# Patient Record
Sex: FEMALE | Race: WHITE | NOT HISPANIC OR LATINO | Employment: STUDENT | URBAN - METROPOLITAN AREA
[De-identification: names, ages, dates, MRNs, and addresses within clinical notes are randomized per-mention and may not be internally consistent; named-entity substitution may affect disease eponyms.]

---
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
## 2022-01-17 ENCOUNTER — TELEPHONE (OUTPATIENT)
Dept: OBGYN | Facility: CLINIC | Age: 22
End: 2022-01-17
Payer: COMMERCIAL

## 2022-01-17 DIAGNOSIS — Z30.011 ENCOUNTER FOR INITIAL PRESCRIPTION OF CONTRACEPTIVE PILLS: Primary | ICD-10-CM

## 2022-01-17 NOTE — TELEPHONE ENCOUNTER
Patient calling with abnormal discharge.  Currently on norelgestromin-ethinyl estradiol (ORTHO EVRA) 150-35 MCG/24HR patch  Brown in color over last 5 days. Now also starting to have some light red spotting. This is not the normal time she would usually get her periods.    Patch came loose in the shower about 5 days ago. Patient states she tried to reapply it, and although it was always on her body, it was not sticking well. Patient just placed a new patch yesterday.    Explained that her body may not have been absorbing the hormones correctly  within the patch if it was not 'sticking' as it should and by potentially having less hormones in her body, it could trigger a period.    Routing pt [employer] message to provider to further advise.  Patient aware that ADELA Ware NP is out of of the office today, she will follow-up tomorrow and either she or a triage nurse will call her back.    Pt verbalized understanding, in agreement with plan, and voiced no further questions.  Joey Anthony RN on 1/17/2022 at 1:51 PM

## 2022-01-19 NOTE — TELEPHONE ENCOUNTER
Left detailed message w response below. Encouraged to call w questions.  Riri Cardozo RN on 1/19/2022 at 4:19 PM

## 2022-01-19 NOTE — TELEPHONE ENCOUNTER
She did the right thing by applying a new patch.     If she continues to have spotting when she is due to change her patch again, she should take a week off and allow a menstrual cycle.     If she finds her patches are not sticking well in the future, we may need to discuss alternative options.

## 2022-01-21 NOTE — TELEPHONE ENCOUNTER
Discussed with patient Riri Ware's instructions below. Patient understood and agrees with plan. Feels as though she would like top switch to ocp versus patch. Feels as though with her swimming and working out and sweating that the patch just does not like to stay on her skin and will curl up on sides.     Patient is open to any ocp options that NORI Mckeon CNP recommends.  Pharmacy updated. Is aware we will call her next week with ocp instructions.       Elise Matos RN

## 2022-01-25 RX ORDER — NORETHINDRONE ACETATE AND ETHINYL ESTRADIOL 1MG-20(21)
1 KIT ORAL DAILY
Qty: 84 TABLET | Refills: 3 | Status: SHIPPED | OUTPATIENT
Start: 2022-01-25 | End: 2023-01-26 | Stop reason: ALTCHOICE

## 2022-01-25 NOTE — TELEPHONE ENCOUNTER
[facility] patient to update her.  [facility]    Joey Anthony RN on 1/25/2022 at 1:30 PM

## 2022-06-28 NOTE — PROGRESS NOTES
SUBJECTIVE:                                                   Juanita Gilbert is a 21 year old female who presents to clinic today for the following health issue(s):  Patient presents with:  Other: Discharge brown            HPI:  Patient is on the ortho evra patch.  She is having brown discharge like old blood in 2nd week of patch.  This has happened in the past when she did continous. In January she was going to change to OCP's but decided not too and stayed on patch bleeding got better.  She also noted a lump under her left armpit last 2 days.      Patient's last menstrual period was 2022..     Patient is sexually active, .  Using Patch for contraception.    reports that she has never smoked. She has never used smokeless tobacco.    STD testing offered?  Declined    Health maintenance updated:  yes    Today's PHQ-2 Score: No flowsheet data found.  Today's PHQ-9 Score:   PHQ-9 SCORE 10/7/2021   PHQ-9 Total Score 2     Today's HENRY-7 Score:   HENRY-7 SCORE 10/7/2021   Total Score 2       Problem list and histories reviewed & adjusted, as indicated.  Additional history: as documented.    There is no problem list on file for this patient.    Past Surgical History:   Procedure Laterality Date     APPENDECTOMY      Child     TONSILLECTOMY        Social History     Tobacco Use     Smoking status: Never Smoker     Smokeless tobacco: Never Used   Substance Use Topics     Alcohol use: Not Currently     Comment: Rare      Problem (# of Occurrences) Relation (Name,Age of Onset)    Hashimoto's thyroiditis (1) Mother    No Known Problems (8) Father, Sister, Brother, Maternal Grandmother, Maternal Grandfather, Paternal Grandmother, Paternal Grandfather, Other    Other - See Comments (1) Mother: Adenomyosis, had a hysterectomy            Current Outpatient Medications   Medication Sig     loratadine (CLARITIN) 10 MG tablet Take 10 mg by mouth daily     norelgestromin-ethinyl estradiol (ORTHO EVRA) 150-35 MCG/24HR  patch Remove old patch and apply new patch onto the skin once a week for 3 weeks (21 days). Do not wear patch week 4 (days 22-28), then repeat.     norethindrone-ethinyl estradiol (JUNEL FE 1/20) 1-20 MG-MCG tablet Take 1 tablet by mouth daily (Patient not taking: Reported on 6/30/2022)     No current facility-administered medications for this visit.     Allergies   Allergen Reactions     Ciprofloxacin Other (See Comments)     Family -severe reaction     Azithromycin Rash     11-            Dermat.-     Penicillins Rash     Red rash around the neck       ROS:  12 point review of systems negative other than symptoms noted below or in the HPI.  No urinary frequency or dysuria, bladder or kidney problems      OBJECTIVE:     /66   Wt 64.4 kg (142 lb)   LMP 06/14/2022   BMI 22.08 kg/m    Body mass index is 22.08 kg/m .    Exam:  Constitutional:  Appearance: Well nourished, well developed alert, in no acute distress  Neurologic:  Mental Status:  Oriented X3.  Normal strength and tone, sensory exam grossly normal, mentation intact and speech normal.    Psychiatric:  Mentation appears normal and affect normal/bright.  Pelvic Exam:  External Genitalia:     Normal appearance for age, no discharge present, no tenderness present, no inflammatory lesions present, color normal  Vagina:     Normal vaginal vault without central or paravaginal defects, no discharge present, no inflammatory lesions present, no masses present  Bladder:     Nontender to palpation  Urethra:   Urethral Body:  Urethra palpation normal, urethra structural support normal   Urethral Meatus:  No erythema or lesions present  Cervix:     Appearance healthy, no lesions present, nontender to palpation, light brown bleeding present  Perineum:     Perineum within normal limits, no evidence of trauma, no rashes or skin lesions present  Anus:     Anus within normal limits, no hemorrhoids present  Inguinal Lymph Nodes:     No lymphadenopathy  present  Pubic Hair:     Normal pubic hair distribution for age  Genitalia and Groin:     No rashes present, no lesions present, no areas of discoloration, no masses present  Left armpit has a pea size hard lump appears to be a ingrown hair.  Patient did shave 2 days ago.  No lymph node noted.       In-Clinic Test Results:  No results found for this or any previous visit (from the past 24 hour(s)).    ASSESSMENT/PLAN:                                                        ICD-10-CM    1. Lump in armpit, left  R22.32    2. DUB (dysfunctional uterine bleeding)  N93.8          Discussed changing to a IUD or OCP's.  Patient does not want a IUD, going to see how next month or two go on patch if continues recommend returning for pelvic US and possibly changing to OCP's.  Warm pack underarm on lump area.  If persists or changes to let me know will ultrasound area.      NORI Cartagena CNP  [facility]

## 2022-06-30 ENCOUNTER — OFFICE VISIT (OUTPATIENT)
Dept: OBGYN | Facility: CLINIC | Age: 22
End: 2022-06-30
Payer: COMMERCIAL

## 2022-06-30 VITALS — BODY MASS INDEX: 22.08 KG/M2 | WEIGHT: 142 LBS | SYSTOLIC BLOOD PRESSURE: 110 MMHG | DIASTOLIC BLOOD PRESSURE: 66 MMHG

## 2022-06-30 DIAGNOSIS — R22.32 LUMP IN ARMPIT, LEFT: Primary | ICD-10-CM

## 2022-06-30 DIAGNOSIS — N93.8 DUB (DYSFUNCTIONAL UTERINE BLEEDING): ICD-10-CM

## 2022-06-30 PROCEDURE — 99213 OFFICE O/P EST LOW 20 MIN: CPT | Performed by: NURSE PRACTITIONER

## 2022-08-21 DIAGNOSIS — Z30.016 ENCOUNTER FOR INITIAL PRESCRIPTION OF TRANSDERMAL PATCH HORMONAL CONTRACEPTIVE DEVICE: ICD-10-CM

## 2022-08-22 RX ORDER — NORELGESTROMIN AND ETHINYL ESTRADIOL 150; 35 UG/D; UG/D
PATCH TRANSDERMAL
Qty: 9 PATCH | Refills: 0 | Status: SHIPPED | OUTPATIENT
Start: 2022-08-22 | End: 2022-11-22

## 2022-08-22 NOTE — TELEPHONE ENCOUNTER
"Requested Prescriptions   Pending Prescriptions Disp Refills     XULANE 150-35 MCG/24HR patch [Pharmacy Med Name: XULANE 150-35 MCG/DAY PATCH] 9 patch 3     Sig: REMOVE OLD PATCH AND APPLY NEW PATCH ONTO THE SKIN ONCE A WEEK FOR 3 WEEKS (21 DAYS). DO NOT WEAR PATCH WEEK 4 (DAYS 22-28), THEN REPEAT.       Contraceptives Protocol Passed - 8/21/2022  7:10 AM        Passed - Patient is not a current smoker if age is 35 or older        Passed - Recent (12 mo) or future (30 days) visit within the authorizing provider's specialty     Patient has had an office visit with the authorizing provider or a provider within the authorizing providers department within the previous 12 mos or has a future within next 30 days. See \"Patient Info\" tab in inbasket, or \"Choose Columns\" in Meds & Orders section of the refill encounter.              Passed - Medication is active on med list        Passed - No active pregnancy on record        Passed - No positive pregnancy test in past 12 months           Last Written Prescription Date:  10/17/21  Last Fill Quantity: 9,  # refills: 3   Last office visit: 6/30/2022 with prescribing provider:  Jeanie   Future Office Visit:  NONE    PLAN:  Normal exam.   Pap and pelvic due at age 23.  M/R/B discussed of pills, patch and ring. Will start patches this weekend.   RTC in 1 year sooner if needed.    Prescription approved per [facility] Refill Protocol.    "

## 2022-11-14 DIAGNOSIS — Z30.016 ENCOUNTER FOR INITIAL PRESCRIPTION OF TRANSDERMAL PATCH HORMONAL CONTRACEPTIVE DEVICE: ICD-10-CM

## 2022-11-14 RX ORDER — NORELGESTROMIN AND ETHINYL ESTRADIOL 150; 35 UG/D; UG/D
PATCH TRANSDERMAL
Qty: 9 PATCH | Refills: 0 | OUTPATIENT
Start: 2022-11-14

## 2022-11-14 NOTE — TELEPHONE ENCOUNTER
Pt due for annual, no appt scheduled. Pt already received one month extension. Rx denied.   Due for annual 10/7/2022  Riri Cardozo RN on 11/14/2022 at 4:55 PM

## 2022-11-14 NOTE — TELEPHONE ENCOUNTER
"Requested Prescriptions   Pending Prescriptions Disp Refills     XULANE 150-35 MCG/24HR patch [Pharmacy Med Name: XULANE 150-35 MCG/DAY PATCH] 9 patch 0     Sig: REMOVE OLD PATCH AND APPLY NEW PATCH ONTO THE SKIN ONCE A WEEK FOR 3 WEEKS (21 DAYS). DO NOT WEAR PATCH WEEK 4 (DAYS 22-28), THEN REPEAT.       Contraceptives Protocol Passed - 11/14/2022 12:09 AM        Passed - Patient is not a current smoker if age is 35 or older        Passed - Recent (12 mo) or future (30 days) visit within the authorizing provider's specialty     Patient has had an office visit with the authorizing provider or a provider within the authorizing providers department within the previous 12 mos or has a future within next 30 days. See \"Patient Info\" tab in inbasket, or \"Choose Columns\" in Meds & Orders section of the refill encounter.              Passed - Medication is active on med list        Passed - No active pregnancy on record        Passed - No positive pregnancy test in past 12 months           Last Written Prescription Date:  08/22/22  Last Fill Quantity: 9,  # refills: 0   Last office visit: 6/30/2022 with prescribing provider:  Riri Ware    Future Office Visit:  None found          "

## 2022-11-19 DIAGNOSIS — Z30.016 ENCOUNTER FOR INITIAL PRESCRIPTION OF TRANSDERMAL PATCH HORMONAL CONTRACEPTIVE DEVICE: ICD-10-CM

## 2022-11-21 RX ORDER — NORELGESTROMIN AND ETHINYL ESTRADIOL 35; 150 UG/MG; UG/MG
PATCH TRANSDERMAL
Qty: 3 PATCH | Refills: 0 | OUTPATIENT
Start: 2022-11-21

## 2022-11-21 NOTE — TELEPHONE ENCOUNTER
PT called and would like refill sent to [facility] in [place]. She is scheduled with Riri on 12/15

## 2022-11-22 ENCOUNTER — TELEPHONE (OUTPATIENT)
Dept: OBGYN | Facility: CLINIC | Age: 22
End: 2022-11-22

## 2022-11-22 RX ORDER — NORELGESTROMIN AND ETHINYL ESTRADIOL 150; 35 UG/D; UG/D
PATCH TRANSDERMAL
Qty: 9 PATCH | Refills: 0 | Status: SHIPPED | OUTPATIENT
Start: 2022-11-22 | End: 2023-02-08

## 2022-11-22 NOTE — TELEPHONE ENCOUNTER
Patient would like to discuss with nurses XULANE 150-35 MCG/24HR patch - please call her to discuss

## 2022-11-22 NOTE — TELEPHONE ENCOUNTER
Patient scheduled her Annual with Riri. Please [facility] [address] in [facility] please

## 2022-11-22 NOTE — TELEPHONE ENCOUNTER
"Requested Prescriptions   Pending Prescriptions Disp Refills     norelgestromin-ethinyl estradiol (XULANE) 150-35 MCG/24HR patch 9 patch 0     Sig: REMOVE OLD PATCH AND APPLY NEW PATCH ONTO THE SKIN ONCE A WEEK FOR 3 WEEKS (21 DAYS). DO NOT WEAR PATCH WEEK 4 (DAYS 22-28), THEN REPEAT.  Strength: 150-35 MCG/24HR       There is no refill protocol information for this order      Refused Prescriptions Disp Refills     norelgestromin-ethinyl estradiol (ORTHO EVRA) 150-35 MCG/24HR patch 3 patch 0     Sig: Remove old patch and apply new patch onto the skin once a week for 3 weeks (21days) do not abel patch week 4 (days 22-28) then repeat       Contraceptives Protocol Passed - 11/21/2022  9:04 AM        Passed - Patient is not a current smoker if age is 35 or older        Passed - Recent (12 mo) or future (30 days) visit within the authorizing provider's specialty     Patient has had an office visit with the authorizing provider or a provider within the authorizing providers department within the previous 12 mos or has a future within next 30 days. See \"Patient Info\" tab in inbasket, or \"Choose Columns\" in Meds & Orders section of the refill encounter.              Passed - Medication is active on med list        Passed - No active pregnancy on record        Passed - No positive pregnancy test in past 12 months           Next 5 appointments (look out 90 days)    Dec 15, 2022  2:50 PM  PHYSICAL with NORI Babcock CNP  [facility] [place] ([facility] ) [address] [place] [id number]  [phone]        Prescription approved per [facility] Refill Protocol.  Amber Sabillon RN on 11/22/2022 at 8:28 AM    "

## 2023-01-26 DIAGNOSIS — Z30.016 ENCOUNTER FOR INITIAL PRESCRIPTION OF TRANSDERMAL PATCH HORMONAL CONTRACEPTIVE DEVICE: ICD-10-CM

## 2023-01-26 RX ORDER — NORELGESTROMIN AND ETHINYL ESTRADIOL 150; 35 UG/D; UG/D
PATCH TRANSDERMAL
Qty: 9 PATCH | Refills: 0 | OUTPATIENT
Start: 2023-01-26

## 2023-01-26 NOTE — TELEPHONE ENCOUNTER
"Requested Prescriptions   Pending Prescriptions Disp Refills     XULANE 150-35 MCG/24HR patch [Pharmacy Med Name: XULANE 150-35 MCG/DAY PATCH] 9 patch 0     Sig: REMOVE OLD PATCH AND APPLY NEW PATCH ONTO THE SKIN ONCE A WEEK FOR 3 WEEKS (21 DAYS). DO NOT WEAR PATCH WEEK 4 (DAYS 22-28), THEN REPEAT. STRENGTH: 150-35 MCG/24HR       Contraceptives Protocol Passed - 1/26/2023  2:58 PM        Passed - Patient is not a current smoker if age is 35 or older        Passed - Recent (12 mo) or future (30 days) visit within the authorizing provider's specialty     Patient has had an office visit with the authorizing provider or a provider within the authorizing providers department within the previous 12 mos or has a future within next 30 days. See \"Patient Info\" tab in inbasket, or \"Choose Columns\" in Meds & Orders section of the refill encounter.              Passed - Medication is active on med list        Passed - No active pregnancy on record        Passed - No positive pregnancy test in past 12 months           Last Written Prescription Date:  11/22/22  Last Fill Quantity: 9,  # refills: 0   Last office visit: 10/7/2021 with prescribing provider:  ADELA Ware NP   Future Office Visit:      12/15/2022 no show for annual    Pt due for annual, no appt scheduled. Pt already received one month extension. Rx denied.   Riri Cardozo RN on 1/26/2023 at 3:03 PM        "

## 2023-02-08 DIAGNOSIS — Z30.016 ENCOUNTER FOR INITIAL PRESCRIPTION OF TRANSDERMAL PATCH HORMONAL CONTRACEPTIVE DEVICE: ICD-10-CM

## 2023-02-08 RX ORDER — NORELGESTROMIN AND ETHINYL ESTRADIOL 150; 35 UG/D; UG/D
PATCH TRANSDERMAL
Qty: 9 PATCH | Refills: 0 | Status: SHIPPED | OUTPATIENT
Start: 2023-02-08

## 2023-02-08 NOTE — TELEPHONE ENCOUNTER
Reason for call:  Medication   If this is a refill request, has the caller requested the refill from the pharmacy already? Yes  Will the patient be using a [facility]? No  [facility] the [facility]    Name of the medication requested: Birth Control patch    Other request: Patient really needs it today    Phone number to reach patient:  Cell number on file:    Telephone Information:   Mobile [phone]       Best Time:  today    Can we leave a detailed message on this number?  YES    Travel screening: Not Applicable

## 2023-02-08 NOTE — TELEPHONE ENCOUNTER
"Requested Prescriptions   Pending Prescriptions Disp Refills     XULANE 150-35 MCG/24HR patch [Pharmacy Med Name: XULANE 150-35 MCG/DAY PATCH] 9 patch 0     Sig: REMOVE OLD PATCH AND APPLY NEW PATCH ONTO THE SKIN ONCE A WEEK FOR 3 WEEKS (21 DAYS). DO NOT WEAR PATCH WEEK 4 (DAYS 22-28), THEN REPEAT. STRENGTH: 150-35 MCG/24HR       Contraceptives Protocol Passed - 2/8/2023 11:17 AM        Passed - Patient is not a current smoker if age is 35 or older        Passed - Recent (12 mo) or future (30 days) visit within the authorizing provider's specialty     Patient has had an office visit with the authorizing provider or a provider within the authorizing providers department within the previous 12 mos or has a future within next 30 days. See \"Patient Info\" tab in inbasket, or \"Choose Columns\" in Meds & Orders section of the refill encounter.              Passed - Medication is active on med list        Passed - No active pregnancy on record        Passed - No positive pregnancy test in past 12 months           Next 5 appointments (look out 90 days)    Feb 21, 2023  1:50 PM  PHYSICAL with NORI Babcock CNP  [facility] [place] ([facility] ) [address] [place] [id number] [phone]        Prescription approved per [facility] Refill Protocol.  Amber Sabillon RN on 2/8/2023 at 11:44 AM    "

## 2023-04-12 ENCOUNTER — LAB REQUISITION (OUTPATIENT)
Dept: LAB | Facility: CLINIC | Age: 23
End: 2023-04-12
Payer: COMMERCIAL

## 2023-04-12 DIAGNOSIS — Z01.419 ENCOUNTER FOR GYNECOLOGICAL EXAMINATION (GENERAL) (ROUTINE) WITHOUT ABNORMAL FINDINGS: ICD-10-CM

## 2023-04-12 PROCEDURE — 87591 N.GONORRHOEAE DNA AMP PROB: CPT | Mod: ORL | Performed by: OBSTETRICS & GYNECOLOGY

## 2023-04-13 LAB
C TRACH DNA SPEC QL PROBE+SIG AMP: NEGATIVE
N GONORRHOEA DNA SPEC QL NAA+PROBE: NEGATIVE

## 2024-04-25 ENCOUNTER — LAB REQUISITION (OUTPATIENT)
Dept: LAB | Facility: CLINIC | Age: 24
End: 2024-04-25
Payer: COMMERCIAL

## 2024-04-25 ENCOUNTER — LAB REQUISITION (OUTPATIENT)
Dept: LAB | Facility: CLINIC | Age: 24
End: 2024-04-25

## 2024-04-25 DIAGNOSIS — Z11.3 ENCOUNTER FOR SCREENING FOR INFECTIONS WITH A PREDOMINANTLY SEXUAL MODE OF TRANSMISSION: ICD-10-CM

## 2024-04-25 DIAGNOSIS — Z12.4 ENCOUNTER FOR SCREENING FOR MALIGNANT NEOPLASM OF CERVIX: ICD-10-CM

## 2024-04-25 LAB
HIV 1+2 AB+HIV1 P24 AG SERPL QL IA: NONREACTIVE
HOLD SPECIMEN: NORMAL

## 2024-04-25 PROCEDURE — G0145 SCR C/V CYTO,THINLAYER,RESCR: HCPCS | Performed by: NURSE PRACTITIONER

## 2024-04-25 PROCEDURE — 86706 HEP B SURFACE ANTIBODY: CPT | Mod: ORL | Performed by: NURSE PRACTITIONER

## 2024-04-25 PROCEDURE — 87389 HIV-1 AG W/HIV-1&-2 AB AG IA: CPT | Mod: ORL | Performed by: NURSE PRACTITIONER

## 2024-04-25 PROCEDURE — 86803 HEPATITIS C AB TEST: CPT | Mod: ORL | Performed by: NURSE PRACTITIONER

## 2024-04-25 PROCEDURE — 86780 TREPONEMA PALLIDUM: CPT | Mod: ORL | Performed by: NURSE PRACTITIONER

## 2024-04-26 LAB
HBV SURFACE AB SERPL IA-ACNC: 8.49 M[IU]/ML
HBV SURFACE AB SERPL IA-ACNC: NONREACTIVE M[IU]/ML
HCV AB SERPL QL IA: NONREACTIVE
T PALLIDUM AB SER QL: NONREACTIVE

## 2024-04-29 LAB
BKR LAB AP GYN ADEQUACY: NORMAL
BKR LAB AP GYN INTERPRETATION: NORMAL
BKR LAB AP HPV REFLEX: NORMAL
BKR LAB AP LMP: NORMAL
BKR LAB AP PREVIOUS ABNL DX: NORMAL
BKR LAB AP PREVIOUS ABNORMAL: NORMAL
PATH REPORT.COMMENTS IMP SPEC: NORMAL
PATH REPORT.COMMENTS IMP SPEC: NORMAL
PATH REPORT.RELEVANT HX SPEC: NORMAL